# Patient Record
(demographics unavailable — no encounter records)

---

## 2024-10-18 NOTE — IMAGING
[Disc space narrowing] : Disc space narrowing [Spondylolithesis] : Spondylolithesis [de-identified] : LSPINE Inspection: No rash or ecchymosis Palpation: No tenderness to palpation or spasm in bilateral thoracic and lumbar paraspinal musculature, no SI joint tenderness to palpation ROM: pain with flexion Strength: 5/5 bilateral hip flexors, knee extensors, ankle dorsiflexors, EHL, ankle plantar flexors Sensation: Sensation present to light touch bilateral L2-S1 distributions Provocative maneuvers: Negative bilateral straight leg raise [AP] : anteroposterior [There are no fractures, subluxations or dislocations. No significant abnormalities are seen] : There are no fractures, subluxations or dislocations. No significant abnormalities are seen [FreeTextEntry1] : L4/5 Spondylolithesis

## 2024-10-18 NOTE — HISTORY OF PRESENT ILLNESS
[de-identified] : Dr. Paredes note- Ms. AISHA SKINNER is a 74 year old female who presents with 3 months of right lumbar, buttock, and hamstring pain. Her symptoms are not improving. She has taken nsaids without much relief. She will get pain standing and driving. Normal bowel and bladder control. Denies any recent fevers, chills, sweats, weight loss, or infection. ---------------------------------------------------------------------------------------------------------------------------------------------- 10/18/24- 78-year-old female presents with low back pain. Symptoms have been present x 2 years with no acute trauma or injury, reports symptoms worsen with standing for extended time. She has managed symptoms by going PT and taking NSAIDs. She has intermittent flare ups, with most recent flare in August 2024 while on vacation, has improved with rest and NSAIDs.   MRI of lumbar spine completed at [facility] radiology 6/2023- full report in chart  PmHx: Crohn's Dz, HTN, HLD, CAD, Hx of Afib, on plavix Sulfa drugs (Rash)

## 2024-12-06 NOTE — HISTORY OF PRESENT ILLNESS
[de-identified] : Dr. Paredes note- Ms. AISHA SKINNER is a 74 year old female who presents with 3 months of right lumbar, buttock, and hamstring pain. Her symptoms are not improving. She has taken nsaids without much relief. She will get pain standing and driving. Normal bowel and bladder control. Denies any recent fevers, chills, sweats, weight loss, or infection. ---------------------------------------------------------------------------------------------------------------------------------------------- 10/18/24- 78-year-old female presents with low back pain. Symptoms have been present x 2 years with no acute trauma or injury, reports symptoms worsen with standing for extended time. She has managed symptoms by going PT and taking NSAIDs. She has intermittent flare ups, with most recent flare in August 2024 while on vacation, has improved with rest and NSAIDs.  12/6/24- Pain has improved with PT. She is not taking pain medication.   MRI of lumbar spine completed at [facility] radiology 6/2023- full report in chart  PmHx: Crohn's Dz, HTN, HLD, CAD, Hx of Afib, on plavix Sulfa drugs (Rash)

## 2024-12-06 NOTE — IMAGING
[Disc space narrowing] : Disc space narrowing [Spondylolithesis] : Spondylolithesis [AP] : anteroposterior [There are no fractures, subluxations or dislocations. No significant abnormalities are seen] : There are no fractures, subluxations or dislocations. No significant abnormalities are seen [de-identified] : LSPINE Inspection: No rash or ecchymosis Palpation: No tenderness to palpation or spasm in bilateral thoracic and lumbar paraspinal musculature, no SI joint tenderness to palpation ROM: pain with flexion Strength: 5/5 bilateral hip flexors, knee extensors, ankle dorsiflexors, EHL, ankle plantar flexors Sensation: Sensation present to light touch bilateral L2-S1 distributions Provocative maneuvers: Negative bilateral straight leg raise [FreeTextEntry1] : L4/5 Spondylolithesis

## 2025-01-24 NOTE — DISCUSSION/SUMMARY
[de-identified] : General Dx Discussion The patient was advised of the diagnosis. The natural history of the pathology was explained in full to the patient in layman's terms. All questions were answered. The risks and benefits of surgical and non-surgical treatment alternatives were explained in full to the patient.  Case discussed. Recommend and request auth to repeat Gel One injection. Follow up with auth.   Entered by NICOLAS Khoury acting as scribe. - The documentation recorded by the scribe accurately reflects the service I personally performed and the decisions made by me.

## 2025-01-24 NOTE — HISTORY OF PRESENT ILLNESS
[de-identified] : She had a Gel One injection in July. Pain has returned. Denies any recent injury or trauma.  [FreeTextEntry1] : right knee

## 2025-01-24 NOTE — PHYSICAL EXAM
[Right] : right knee [5___] : hamstring 5[unfilled]/5 [Negative] : negative Edgardo's [] : no pain with varus stress [TWNoteComboBox7] : flexion 120 degrees [de-identified] : extension 3 degrees

## 2025-05-09 NOTE — HISTORY OF PRESENT ILLNESS
[4] : 4 [0] : 0 [Dull/Aching] : dull/aching [Sharp] : sharp [Occasional] : occasional [Ice] : ice [Full time] : Work status: full time [de-identified] : Left knee pain since a fall on Friday 5/2/2025. [] : no [FreeTextEntry1] : Left knee [de-identified] : twisting

## 2025-05-09 NOTE — DISCUSSION/SUMMARY
[de-identified] : General Dx Discussion The patient was advised of the diagnosis. The natural history of the pathology was explained in full to the patient in layman's terms. All questions were answered. The risks and benefits of surgical and non-surgical treatment alternatives were explained in full to the patient.  Case discussed. She will be sent for an MRI to r/o a fracture. ACE bandage for comfort. Follow up after MRI.   Entered by NICOLAS Khoury acting as scribe. - The documentation recorded by the scribe accurately reflects the service I personally performed and the decisions made by me.

## 2025-05-09 NOTE — HISTORY OF PRESENT ILLNESS
[4] : 4 [0] : 0 [Dull/Aching] : dull/aching [Sharp] : sharp [Occasional] : occasional [Ice] : ice [Full time] : Work status: full time [de-identified] : Left knee pain since a fall on Friday 5/2/2025. [] : no [FreeTextEntry1] : Left knee [de-identified] : twisting

## 2025-05-09 NOTE — IMAGING
[Left] : left knee [All Views] : anteroposterior, lateral, skyline, and anteroposterior standing [FreeTextEntry9] : irregularity medial tibial plateau, can not r/o fracture

## 2025-05-09 NOTE — PHYSICAL EXAM
[Left] : left knee [5___] : hamstring 5[unfilled]/5 [] : no deformities of patellar tendon [TWNoteComboBox7] : flexion 125 degrees [de-identified] : extension 0 degrees

## 2025-05-09 NOTE — DISCUSSION/SUMMARY
[de-identified] : General Dx Discussion The patient was advised of the diagnosis. The natural history of the pathology was explained in full to the patient in layman's terms. All questions were answered. The risks and benefits of surgical and non-surgical treatment alternatives were explained in full to the patient.  Case discussed. She will be sent for an MRI to r/o a fracture. ACE bandage for comfort. Follow up after MRI.   Entered by NICOLAS Khoury acting as scribe. - The documentation recorded by the scribe accurately reflects the service I personally performed and the decisions made by me.

## 2025-05-09 NOTE — PHYSICAL EXAM
[Left] : left knee [5___] : hamstring 5[unfilled]/5 [] : no deformities of patellar tendon [TWNoteComboBox7] : flexion 125 degrees [de-identified] : extension 0 degrees

## 2025-05-16 NOTE — DISCUSSION/SUMMARY
[de-identified] : General Dx Discussion The patient was advised of the diagnosis. The natural history of the pathology was explained in full to the patient in layman's terms. All questions were answered. The risks and benefits of surgical and non-surgical treatment alternatives were explained in full to the patient.  Case discussed. She will be sent for an MRI to r/o a fracture. ACE bandage for comfort. Follow up after MRI.   Entered by NICOLAS Khoury acting as scribe. - The documentation recorded by the scribe accurately reflects the service I personally performed and the decisions made by me.

## 2025-05-16 NOTE — DATA REVIEWED
[MRI] : MRI [Left] : left [Report was reviewed and noted in the chart] : The report was reviewed and noted in the chart [I independently reviewed and interpreted images and report] : I independently reviewed and interpreted images and report [I reviewed the films/CD] : I reviewed the films/CD [FreeTextEntry1] : mm tear synovitis OA

## 2025-05-16 NOTE — PHYSICAL EXAM
[Left] : left knee [5___] : hamstring 5[unfilled]/5 [] : no deformities of patellar tendon [TWNoteComboBox7] : flexion 120 degrees [de-identified] : extension 0 degrees

## 2025-06-27 NOTE — PHYSICAL EXAM
[Left] : left knee [5___] : hamstring 5[unfilled]/5 [] : no deformities of patellar tendon [TWNoteComboBox7] : flexion 120 degrees [de-identified] : extension 0 degrees

## 2025-06-27 NOTE — HISTORY OF PRESENT ILLNESS
[Result of repetitive motion] : result of repetitive motion [4] : 4 [0] : 0 [Dull/Aching] : dull/aching [Sharp] : sharp [Occasional] : occasional [Ice] : ice [Full time] : Work status: full time [de-identified] : 06/27/25: Patient is here to follow up on the LEFT KNEE. patient notes improvement in the knee. Patient finished PT this week. [] : Post Surgical Visit: no [FreeTextEntry1] : Left knee [de-identified] : twisting

## 2025-06-27 NOTE — DISCUSSION/SUMMARY
[de-identified] : General Dx Discussion The patient was advised of the diagnosis. The natural history of the pathology was explained in full to the patient in layman's terms. All questions were answered. The risks and benefits of surgical and non-surgical treatment alternatives were explained in full to the patient.  Case discussed. Continue HEP. Will plan for visco injections of the right knee.   Entered by NICOLAS Jones acting as scribe. - The documentation recorded by the scribe accurately reflects the service I personally performed and the decisions made by me.

## 2025-07-18 NOTE — HISTORY OF PRESENT ILLNESS
[Full time] : Work status: full time [1] : 1 [Gel One] : Gel One [de-identified] : Today Rt Knee Gel Oe injection  [] : Post Surgical Visit: no [de-identified] : rt knee

## 2025-07-18 NOTE — PHYSICAL EXAM
[5___] : hamstring 5[unfilled]/5 [Right] : right knee [] : no pain with varus stress [TWNoteComboBox7] : flexion 110 degrees [de-identified] : extension 0 degrees

## 2025-07-18 NOTE — PROCEDURE
[Large Joint Injection] : Large joint injection [Right] : of the right [Knee] : knee [Pain] : pain [X-ray evidence of Osteoarthritis on this or prior visit] : x-ray evidence of Osteoarthritis on this or prior visit [Alcohol] : alcohol [Betadine] : betadine [Ethyl Chloride sprayed topically] : ethyl chloride sprayed topically [Sterile technique used] : sterile technique used [Gel-One (30mg)] : 30mg of Gel-One [] : Patient tolerated procedure well [Call if redness, pain or fever occur] : call if redness, pain or fever occur [Apply ice for 15min out of every hour for the next 12-24 hours as tolerated] : apply ice for 15 minutes out of every hour for the next 12-24 hours as tolerated [Previous OTC use and PT nontherapeutic] : patient has tried OTC's including aspirin, Ibuprofen, Aleve, etc or prescription NSAIDS, and/or exercises at home and/or physical therapy without satisfactory response [Patient had decreased mobility in the joint] : patient had decreased mobility in the joint [Risks, benefits, alternatives discussed / Verbal consent obtained] : the risks benefits, and alternatives have been discussed, and verbal consent was obtained [Prior failure or difficult injection] : prior failure or difficult injection [Altered anatomic landmarks d/t erosive arthritis] : altered anatomic landmarks d/t erosive arthritis [All ultrasound images have been permanently captured and stored accordingly in our picture archiving and communication system] : All ultrasound images have been permanently captured and stored accordingly in our picture archiving and communication system [Visualization of the needle and placement of injection was performed without complication] : visualization of the needle and placement of injection was performed without complication